# Patient Record
Sex: MALE | Race: WHITE | NOT HISPANIC OR LATINO | Employment: FULL TIME | URBAN - METROPOLITAN AREA
[De-identification: names, ages, dates, MRNs, and addresses within clinical notes are randomized per-mention and may not be internally consistent; named-entity substitution may affect disease eponyms.]

---

## 2017-10-29 ENCOUNTER — APPOINTMENT (EMERGENCY)
Dept: RADIOLOGY | Facility: HOSPITAL | Age: 52
End: 2017-10-29
Payer: COMMERCIAL

## 2017-10-29 ENCOUNTER — HOSPITAL ENCOUNTER (EMERGENCY)
Facility: HOSPITAL | Age: 52
Discharge: HOME | End: 2017-10-29
Attending: EMERGENCY MEDICINE | Admitting: EMERGENCY MEDICINE
Payer: COMMERCIAL

## 2017-10-29 VITALS
RESPIRATION RATE: 20 BRPM | SYSTOLIC BLOOD PRESSURE: 177 MMHG | OXYGEN SATURATION: 97 % | BODY MASS INDEX: 27.94 KG/M2 | WEIGHT: 178 LBS | DIASTOLIC BLOOD PRESSURE: 100 MMHG | HEIGHT: 67 IN | HEART RATE: 85 BPM | TEMPERATURE: 98.1 F
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
## 2017-10-29 DIAGNOSIS — S89.92XA LEFT LEG INJURY, INITIAL ENCOUNTER: Primary | ICD-10-CM

## 2017-10-29 PROCEDURE — 73564 X-RAY EXAM KNEE 4 OR MORE: CPT

## 2017-10-29 PROCEDURE — 73502 X-RAY EXAM HIP UNI 2-3 VIEWS: CPT

## 2017-10-29 PROCEDURE — 99283 EMERGENCY DEPT VISIT LOW MDM: CPT

## 2017-10-29 PROCEDURE — 73552 X-RAY EXAM OF FEMUR 2/>: CPT

## 2017-10-29 RX ORDER — TRAMADOL HYDROCHLORIDE 50 MG/1
50 TABLET ORAL EVERY 6 HOURS PRN
Qty: 10 TABLET | Refills: 0 | Status: SHIPPED | OUTPATIENT
Start: 2017-10-29 | End: 2019-05-06 | Stop reason: ALTCHOICE

## 2017-10-29 RX ORDER — AMLODIPINE BESYLATE AND ATORVASTATIN CALCIUM 5; 20 MG/1; MG/1
1 TABLET, FILM COATED ORAL DAILY
COMMUNITY
End: 2018-11-05 | Stop reason: SDUPTHER

## 2017-10-29 NOTE — ED PROVIDER NOTES
History  Chief Complaint   Patient presents with    Leg Injury     Patient states"he fell down hsi basement stairs and injured his left thigh"  Patient is a 75-year-old male with past medical history significant for CLL, currently receiving daily chemotherapy, hypertension and newly diagnosed COPD, presents to the emergency department complaining of left thigh pain after he fell down several steps last night  Patient states he went to check on the basement to ensure that the lights were turned off before bed last night and when he took a step with his right leg on the staircase, he tripped and his left leg got stuck, causing him to fall down 3-4 steps  He states he heard and felt a crack in his left thigh and has been having pain in the left thigh since  He denies hitting his head or loss of consciousness  He denies any presyncopal symptoms prior to the fall  He denies being on any blood thinners  He took ibuprofen earlier today with mild relief  He states the pain is worse with weight-bearing as well as with hip and knee flexion  He states he has no difficulty walking down steps however he cannot lift his leg to walk up the steps  He denies any prior left lower extremity injury or fracture  Denies any associated left lower extremity weakness or paresthesia  Denies any significant swelling, bruising or erythema  Denies any other associated symptoms and review of systems otherwise negative  No fever, chills, headache, dizziness, productive cough, chest pain, dyspnea, abdominal pain, nausea, vomiting, diarrhea, constipation, urinary symptoms, focal neurologic deficits  History provided by:  Patient and spouse   used: No        Prior to Admission Medications   Prescriptions Last Dose Informant Patient Reported? Taking?    Glycopyrrolate-Formoterol (BEVESPI AEROSPHERE) 9-4 8 MCG/ACT AERO   Yes Yes   Sig: Inhale   Ibrutinib (IMBRUVICA) 140 MG CAPS   Yes Yes   Sig: Take by mouth Mometasone Furo-Formoterol Fum (DULERA) 100-5 MCG/ACT AERO   Yes Yes   Sig: Inhale   Mometasone Furo-Formoterol Fum (DULERA) 200-5 MCG/ACT AERO   Yes Yes   Sig: Inhale   amLODIPine-atorvastatin (CADUET) 5-20 MG per tablet   Yes Yes   Sig: Take 1 tablet by mouth daily      Facility-Administered Medications: None       Past Medical History:   Diagnosis Date    CLL (chronic lymphocytic leukemia) (HCC)     COPD (chronic obstructive pulmonary disease) (HCC)     Hypertension        Past Surgical History:   Procedure Laterality Date    LYMPH NODE BIOPSY         History reviewed  No pertinent family history  I have reviewed and agree with the history as documented  Social History   Substance Use Topics    Smoking status: Never Smoker    Smokeless tobacco: Never Used    Alcohol use No        Review of Systems   Constitutional: Negative for chills and fever  HENT: Negative for congestion, ear pain, rhinorrhea and sore throat  Eyes: Negative for photophobia, pain and visual disturbance  Respiratory: Negative for cough, chest tightness, shortness of breath and wheezing  Cardiovascular: Negative for chest pain, palpitations and leg swelling  Gastrointestinal: Negative for abdominal pain, constipation, diarrhea, nausea and vomiting  Genitourinary: Negative for dysuria, flank pain, frequency and hematuria  Musculoskeletal: Negative for back pain, joint swelling, neck pain and neck stiffness  + Left thigh and knee pain  Skin: Negative for color change, rash and wound  Allergic/Immunologic: Negative for immunocompromised state  Neurological: Negative for dizziness, weakness, light-headedness, numbness and headaches  Psychiatric/Behavioral: Negative for confusion and decreased concentration  All other systems reviewed and are negative        Physical Exam  ED Triage Vitals [10/29/17 1744]   Temperature Pulse Respirations Blood Pressure SpO2   98 1 °F (36 7 °C) 85 20 (!) 177/100 97 % Temp Source Heart Rate Source Patient Position - Orthostatic VS BP Location FiO2 (%)   Oral Monitor Sitting Left arm --      Pain Score       --           Physical Exam   Constitutional: He is oriented to person, place, and time  He appears well-developed and well-nourished  No distress  Patient ambulatory to examination room however is limping on left leg and favoring right lower extremity  HENT:   Head: Normocephalic and atraumatic  Mouth/Throat: Oropharynx is clear and moist    Eyes: Conjunctivae and EOM are normal  Pupils are equal, round, and reactive to light  Neck: Normal range of motion  Neck supple  No JVD present  Cardiovascular: Normal rate, regular rhythm, normal heart sounds and intact distal pulses  Exam reveals no gallop and no friction rub  No murmur heard  Pulmonary/Chest: Effort normal and breath sounds normal  No respiratory distress  He has no wheezes  He has no rales  Abdominal: Soft  He exhibits no distension  There is no tenderness  There is no rebound and no guarding  Musculoskeletal: He exhibits tenderness  He exhibits no edema or deformity  LEFT LOWER EXTREMITY:  No obvious left lower extremity deformity  No significant soft tissue swelling or joint effusion  There is tenderness over the left greater trochanter, lateral and anterior thigh  No tenderness distal to the knee joint  Full passive range of motion of left hip and left knee  Limited active range of motion with hip and knee flexion secondary to pain  2+ DP and PT pulse  No overlying skin color or temperature abnormalities  Neurological: He is alert and oriented to person, place, and time  No gross motor or sensory deficits  Skin: Skin is warm and dry  He is not diaphoretic  No erythema  No pallor  Psychiatric: He has a normal mood and affect  His behavior is normal    Nursing note and vitals reviewed        ED Medications  Medications - No data to display    Diagnostic Studies  Results Reviewed None                 XR femur 2 views LEFT   ED Interpretation by Sahara Isabel DO (10/29 1822)   No acute osseous abnormality  XR knee 4+ views left injury   ED Interpretation by Sahara Isabel DO (10/29 1822)   No acute osseous abnormality  XR hip/pelv 2-3 vws left   ED Interpretation by Sahara Isabel DO (10/29 1822)   No acute osseous abnormality  Procedures  Procedures       Phone Contacts  ED Phone Contact    ED Course  ED Course                                MDM  Number of Diagnoses or Management Options  Diagnosis management comments: Left lower extremity injury status post mechanical fall down several steps  Low clinical suspicion for acute fracture given that this injury occurred last night and patient has been walking on the leg since  Will obtain x-rays of the left hip/pelvis, left femur and left knee  Offered patient pain medication however he declined at this time and states he just recently took ibuprofen  If x-rays, unremarkable, will discharge home with instructions to take NSAIDs as needed, ice on and off, rest and elevation  Will have him follow up with his family doctor  Amount and/or Complexity of Data Reviewed  Tests in the radiology section of CPT®: ordered and reviewed  Independent visualization of images, tracings, or specimens: yes      CritCare Time    Disposition  Final diagnoses:   Left leg injury, initial encounter   Fall down steps, initial encounter     Time reflects when diagnosis was documented in both MDM as applicable and the Disposition within this note     Time User Action Codes Description Comment    10/29/2017  6:23 PM Travis León Add [C67 [id number]] Left leg injury, initial encounter     10/29/2017  6:23 PM Reyna, [address] Fall down steps, initial encounter       ED Disposition     ED Disposition Condition Comment    Discharge  Tyra Calzada discharge to home/self care      Condition at discharge: Stable Follow-up Information     Follow up With Specialties Details Why Contact Info Additional Information    Michael Strong MD Internal Medicine Schedule an appointment as soon as possible for a visit  [address] [facility], [address] [facility], Orthopedic Surgery Schedule an appointment as soon as possible for a visit  [address] [place] [address]       [facility] Department Emergency Medicine Go to If symptoms worsen [address] [facility] [address] [place] ED, [address] [place], [place], [facility]        Patient's Medications   Discharge Prescriptions    TRAMADOL (ULTRAM) 50 MG TABLET    Take 1 tablet by mouth every 6 (six) hours as needed for severe pain       Start Date: 10/29/2017End Date: --       Order Dose: 50 mg       Quantity: 10 tablet    Refills: 0     No discharge procedures on file      ED Provider  Electronically Signed by           Rick Goodman DO  10/29/17 7227

## 2017-10-29 NOTE — DISCHARGE INSTRUCTIONS
Leg Sprain, Ambulatory Care   GENERAL INFORMATION:   A leg sprain  is an injury that occurs when your ligaments are forced to stretch beyond their normal range  Ligaments are tough tissues that support joints, and connect and keep bones in place  Sprains mainly occur with twisting, falling, or blunt force injuries  Mild sprains may take up to 6 weeks to heal  Severe sprains can take up to 12 months to heal   Common symptoms include the following:   · Inability to put weight on your leg    · Pain, tenderness, and swelling    · Muscle spasms  Seek immediate care for the following symptoms:   · Cold or numbness below the injury, such as in your toes    · Decreased or loss of movement in your injured leg    · Increased pain, even after taking pain medicine    · Red skin streaks near your injury  Treatment for a leg sprain  may include pain medicine and physical therapy  Treatment may also include a support device, such as a brace, cast, or splint  These devices limit movement and protect further injury  Care for a leg sprain:   · Rest  your leg for up to 2 days to help it heal  Use crutches as directed to take weight off your leg while it heals  · Apply ice  on your leg for 15 to 20 minutes every hour or as directed  Use an ice pack, or put crushed ice in a plastic bag  Cover it with a towel  Ice helps prevent tissue damage and decreases swelling and pain  · Compress  your injured leg as directed with an elastic bandage for support  You may need a splint if your sprain is severe  Wear your splint for as many days as directed  · Elevate  your injured leg by lying down and resting it on pillows that are higher than your heart  This should be done as often as you can for at least 2 days to reduce swelling  · Exercise  your leg as directed to improve your strength and help decrease stiffness  The exercises and physical therapy can help restore strength and increase the range of motion in your leg   Ask your healthcare provider when you can return to your normal activities or play sports  Prevent another leg sprain:   · Warm up, cool down, and stretch before and after you exercise  This may help ease your body into activity, and prevent another injury  · Wear protective equipment for activities  This will prevent another injury  · Wear shoes that fit well  Replace your shoes when the tread or heels are worn down  · Do not exercise when you are tired or in pain  You are more likely to become injured if your body is not rested  · Make the places you walk safer  Keep your pathways clear of objects so you do not trip over them  Pour salt on driveways and walkways in the winter to help prevent you from slipping on ice  · Run and walk on flat surfaces  Bumpy or curvy paths put you at risk for another injury  Follow up with your healthcare provider as directed:  Write down your questions so you remember to ask them during your visits  CARE AGREEMENT:   You have the right to help plan your care  Learn about your health condition and how it may be treated  Discuss treatment options with your caregivers to decide what care you want to receive  You always have the right to refuse treatment  The above information is an  only  It is not intended as medical advice for individual conditions or treatments  Talk to your doctor, nurse or pharmacist before following any medical regimen to see if it is safe and effective for you  © 2014 [address] is for End User's use only and may not be sold, redistributed or otherwise used for commercial purposes  All illustrations and images included in CareNotes® are the copyrighted property of [employer]  or Mina Sánchez

## 2018-09-19 ENCOUNTER — OFFICE VISIT (OUTPATIENT)
Dept: CARDIOLOGY CLINIC | Facility: CLINIC | Age: 53
End: 2018-09-19
Payer: COMMERCIAL

## 2018-09-19 VITALS
HEART RATE: 96 BPM | BODY MASS INDEX: 28.72 KG/M2 | WEIGHT: 183 LBS | DIASTOLIC BLOOD PRESSURE: 82 MMHG | SYSTOLIC BLOOD PRESSURE: 144 MMHG | HEIGHT: 67 IN | OXYGEN SATURATION: 96 %

## 2018-09-19 DIAGNOSIS — R06.00 DYSPNEA ON EXERTION: Primary | ICD-10-CM

## 2018-09-19 DIAGNOSIS — I10 ESSENTIAL HYPERTENSION: ICD-10-CM

## 2018-09-19 PROCEDURE — 99214 OFFICE O/P EST MOD 30 MIN: CPT | Performed by: INTERNAL MEDICINE

## 2018-09-19 NOTE — PATIENT INSTRUCTIONS
Check echo to assess for lvef and rwm for abnormalities  Check stress echo to assess for ishcemia and exercise ability  Return in 6-8 weeks  Call if symptoms worsen

## 2018-09-19 NOTE — PROGRESS NOTES
[facility] [place]  [address]  [address]  Cardiology Follow Up    Juan Grewal  1965  [id number]    1  Dyspnea on exertion  Echo complete with contrast if indicated    Echo stress test w contrast if indicated   2  Essential hypertension         Chief Complaint   Patient presents with    Follow-up    Shortness of Breath       Interval History:  Patient presents to the office for follow-up visit with complaints of shortness of breath  Patient states he has been having shortness of breath for the last couple of months  He was seen by allergist and was worked up with no real diagnosis  He has also seen by pulmonologist had multiple tests done with no real diagnosis to explain his shortness of breath  Patient states he gets short of breath with minimal exertion  Patient denies any chest pain, chest pressure, chest heaviness  Patient is a former smoker quitting more than 20 years ago  Past Medical History:   Diagnosis Date    [facility] )     [facility] disease) ([facility])     Hypertension      Social History     Social History    Marital status: /Civil Union     Spouse name: N/A    Number of children: N/A    Years of education: N/A     Occupational History    Not on file  Social History Main Topics    Smoking status: Former Smoker    Smokeless tobacco: Never Used    Alcohol use No    Drug use: No    Sexual activity: Not on file     Other Topics Concern    Not on file     Social History Narrative    No narrative on file      History reviewed  No pertinent family history    Past Surgical History:   Procedure Laterality Date    LYMPH NODE BIOPSY         Current Outpatient Prescriptions:     amLODIPine-atorvastatin (CADUET) 5-20 MG per tablet, Take 1 tablet by mouth daily, Disp: , Rfl:     Ibrutinib (IMBRUVICA) 140 MG CAPS, Take by mouth 2 (two) times a day  , Disp: , Rfl:     Mometasone Furo-Formoterol Fum (DULERA) 100-5 MCG/ACT AERO, Inhale, Disp: , Rfl:     Mometasone Furo-Formoterol Fum (DULERA) 200-5 MCG/ACT AERO, Inhale, Disp: , Rfl:     Glycopyrrolate-Formoterol (BEVESPI AEROSPHERE) 9-4 8 MCG/ACT AERO, Inhale, Disp: , Rfl:     traMADol (ULTRAM) 50 mg tablet, Take 1 tablet by mouth every 6 (six) hours as needed for severe pain (Patient not taking: Reported on 9/19/2018 ), Disp: 10 tablet, Rfl: 0  No Known Allergies      Imaging: No results found  Review of Systems:  Review of Systems   Constitutional: Negative  HENT: Negative  Respiratory: Positive for shortness of breath  Cardiovascular: Negative  Genitourinary: Negative  Neurological: Negative  Hematological: Negative  All other systems reviewed and are negative  /82   Pulse 96   Ht 5' 7" (1 702 m)   Wt 83 kg (183 lb)   SpO2 96%   BMI 28 66 kg/m²     Physical Exam:  Physical Exam   Constitutional: He is oriented to person, place, and time  He appears well-developed and well-nourished  HENT:   Head: Normocephalic and atraumatic  Cardiovascular: Normal rate, regular rhythm and normal heart sounds  Pulmonary/Chest: Effort normal and breath sounds normal    Musculoskeletal: Normal range of motion  He exhibits no edema  Neurological: He is alert and oriented to person, place, and time  Skin: Skin is warm and dry  Psychiatric: He has a normal mood and affect  His behavior is normal  Judgment and thought content normal    Nursing note and vitals reviewed  Discussion/Summary:  1-dyspnea on exertion, new for the patient  Will check echocardiogram to assess LV function and regional wall motion for abnormalities  Will check stress echocardiogram to assess for ischemia and exercise ability  Briefly discussed the any of these tests are abnormal will need further testing including cardiac catheterization  2-hypertension, stable    Continue all medications    Return to the office in 6-8 weeks  Continue all medications  Previous studies reviewed with patient, medications reviewed and possible side effects discussed  Continue risk factor modification  Optimize weight, regular exercise and follow up with appropriate specialists and primary care physician as discussed  All questions answered  Patient advised to report any problems prompting to medical attention   Return for follow up visit in 6 weeks or earlier if needed

## 2018-09-21 ENCOUNTER — TELEPHONE (OUTPATIENT)
Dept: CARDIOLOGY CLINIC | Facility: CLINIC | Age: 53
End: 2018-09-21

## 2018-09-21 NOTE — TELEPHONE ENCOUNTER
Patients echo and stress echo both require peer to peer review  Must be done before 9/25 or case will close out  Phone number [phone]  Use patients ID to access- [id number]  Thank you

## 2018-10-17 ENCOUNTER — HOSPITAL ENCOUNTER (OUTPATIENT)
Dept: NON INVASIVE DIAGNOSTICS | Facility: CLINIC | Age: 53
Discharge: HOME | End: 2018-10-17
Payer: COMMERCIAL

## 2018-10-17 DIAGNOSIS — R06.00 DYSPNEA ON EXERTION: ICD-10-CM

## 2018-10-17 LAB
MAX DIASTOLIC BP: 106 MMHG
MAX HEART RATE: 148 BPM
MAX PREDICTED HEART RATE: 166 BPM
MAX. SYSTOLIC BP: 248 MMHG
PROTOCOL NAME: NORMAL
TARGET HR FORMULA: NORMAL
TEST INDICATION: NORMAL
TIME IN EXERCISE PHASE: NORMAL

## 2018-10-17 PROCEDURE — 93306 TTE W/DOPPLER COMPLETE: CPT

## 2018-10-17 PROCEDURE — 93350 STRESS TTE ONLY: CPT

## 2018-10-18 PROCEDURE — 93351 STRESS TTE COMPLETE: CPT | Performed by: INTERNAL MEDICINE

## 2018-10-18 PROCEDURE — 93306 TTE W/DOPPLER COMPLETE: CPT | Performed by: INTERNAL MEDICINE

## 2018-10-30 ENCOUNTER — OFFICE VISIT (OUTPATIENT)
Dept: CARDIOLOGY CLINIC | Facility: CLINIC | Age: 53
End: 2018-10-30
Payer: COMMERCIAL

## 2018-10-30 VITALS
DIASTOLIC BLOOD PRESSURE: 82 MMHG | SYSTOLIC BLOOD PRESSURE: 138 MMHG | HEIGHT: 67 IN | WEIGHT: 179 LBS | BODY MASS INDEX: 28.09 KG/M2 | OXYGEN SATURATION: 97 % | HEART RATE: 97 BPM

## 2018-10-30 DIAGNOSIS — R06.00 DYSPNEA ON EXERTION: ICD-10-CM

## 2018-10-30 DIAGNOSIS — I10 HYPERTENSION, ESSENTIAL: Primary | ICD-10-CM

## 2018-10-30 PROCEDURE — 99213 OFFICE O/P EST LOW 20 MIN: CPT | Performed by: INTERNAL MEDICINE

## 2018-10-30 NOTE — PROGRESS NOTES
[facility]  [address] [facility]  [address]  Cardiology Follow Up    Ashish Miller  1965  [id number]      1  Hypertension, essential     2  Dyspnea on exertion         Chief Complaint   Patient presents with    Follow-up    Results       Interval History:   Patient presents for follow-up visit  Patient states that his shortness of breath is improved  Patient's chemotherapy agents were decreased  His shortness of breath has gotten better after that  Patient denies any chest pain  No history of leg edema orthopnea PN D  No history of presyncope syncope  He states that he has been compliant with all his present medications  Patient Active Problem List   Diagnosis    Hypertension, essential     Past Medical History:   Diagnosis Date    [facility] ([facility] )     COPD [facility]chronic obstructive pulmonary disease) ([facility] )     Hypertension      Social History     Social History    Marital status: /Civil Union     Spouse name: N/A    Number of children: N/A    Years of education: N/A     Occupational History    Not on file  Social History Main Topics    Smoking status: Former Smoker    Smokeless tobacco: Never Used    Alcohol use No    Drug use: No    Sexual activity: Not on file     Other Topics Concern    Not on file     Social History Narrative    No narrative on file      History reviewed  No pertinent family history    Past Surgical History:   Procedure Laterality Date    LYMPH NODE BIOPSY         Current Outpatient Prescriptions:     amLODIPine-atorvastatin (CADUET) 5-20 MG per tablet, Take 1 tablet by mouth daily, Disp: , Rfl:     Glycopyrrolate-Formoterol (BEVESPI AEROSPHERE) 9-4 8 MCG/ACT AERO, Inhale, Disp: , Rfl:     Ibrutinib (IMBRUVICA) 140 MG CAPS, Take by mouth daily  , Disp: , Rfl:     Mometasone Furo-Formoterol Fum (DULERA) 100-5 MCG/ACT AERO, Inhale, Disp: , Rfl:     Mometasone Furo-Formoterol Fum (Arevalo Mering) 200-5 MCG/ACT AERO, Inhale, Disp: , Rfl:     traMADol (ULTRAM) 50 mg tablet, Take 1 tablet by mouth every 6 (six) hours as needed for severe pain (Patient not taking: Reported on 9/19/2018 ), Disp: 10 tablet, Rfl: 0  No Known Allergies    Labs:  Hospital Outpatient Visit on 10/17/2018   Component Date Value    Protocol Name 10/17/2018 BRENT     Time In Exercise Phase 10/17/2018 00:06:00     MAX  SYSTOLIC BP 88/50/0403 424     Max Diastolic Bp 88/75/6199 266     Max Heart Rate 10/17/2018 148     Max Predicted Heart Rate 10/17/2018 166     Reason for Termination 10/17/2018                      Value:Target Heart Rate Achieved  Exaggerated BP increase      Test Indication 10/17/2018 Dyspnea on effort     Target Hr Formular 10/17/2018 (220 - Age)*85%      Imaging: No results found  Review of Systems:  Review of Systems   REVIEW OF SYSTEMS:  Constitutional:  Denies fever or chills   Eyes:  Denies change in visual acuity   HENT:  Denies nasal congestion or sore throat   Respiratory:  shortness of breath   Cardiovascular:  Denies chest pain or edema   GI:  Denies abdominal pain, nausea, vomiting, bloody stools or diarrhea   :  Denies dysuria, frequency, difficulty in micturition and nocturia  Musculoskeletal:  Denies back pain or joint pain   Neurologic:  Denies headache, focal weakness or sensory changes   Endocrine:  Denies polyuria or polydipsia   Lymphatic:  Denies swollen glands   Psychiatric:  Denies depression or anxiety     Physical Exam:    /82   Pulse 97   Ht 5' 7" (1 702 m)   Wt 81 2 kg (179 lb)   SpO2 97%   BMI 28 04 kg/m²     Physical Exam   PHYSICAL EXAM:  General:  Patient is not in acute distress   Head: Normocephalic, Atraumatic  HEENT:  Both pupils normal-size atraumatic, normocephalic, nonicteric  Neck:  JVP not raised  Trachea central  No carotid bruit  Respiratory:  normal breath sounds no crackles   no rhonchi  Cardiovascular:  Regular rate and rhythm no S3 no murmurs  GI: Abdomen soft nontender  No organomegaly  Lymphatic:  No cervical or inguinal lymphadenopathy  Neurologic:  Patient is awake alert, oriented   Grossly nonfocal    Discussion/Summary:   Patient with known history of hypertension with symptoms of shortness of breath which has in fact improved according to the patient  Patient did have a stress echocardiogram which was negative for ischemia  Sensitivity and specificity of stress test discussed at length with patient and family  Symptoms to watch out from cardiac standpoint which would indicate the need for further cardiac evaluation including cardiac catheterization discussed  Patient will keep a close watch on his symptoms and report any symptoms out of the ordinary  Dietary and risk factor modification to continue  Follow-up with primary care physician as well as Hematology Oncology  Follow-up in 6 months or earlier as needed

## 2018-11-05 DIAGNOSIS — I10 HYPERTENSION, UNSPECIFIED TYPE: Primary | ICD-10-CM

## 2018-11-05 RX ORDER — AMLODIPINE BESYLATE AND ATORVASTATIN CALCIUM 5; 20 MG/1; MG/1
1 TABLET, FILM COATED ORAL DAILY
Qty: 90 TABLET | Refills: 3 | Status: SHIPPED | OUTPATIENT
Start: 2018-11-05 | End: 2019-07-17 | Stop reason: SDUPTHER

## 2019-04-24 ENCOUNTER — OFFICE VISIT (OUTPATIENT)
Dept: CARDIOLOGY CLINIC | Facility: CLINIC | Age: 54
End: 2019-04-24
Payer: COMMERCIAL

## 2019-04-24 VITALS
SYSTOLIC BLOOD PRESSURE: 130 MMHG | HEIGHT: 67 IN | WEIGHT: 183.8 LBS | DIASTOLIC BLOOD PRESSURE: 82 MMHG | BODY MASS INDEX: 28.85 KG/M2 | OXYGEN SATURATION: 96 % | HEART RATE: 91 BPM

## 2019-04-24 DIAGNOSIS — R06.02 SHORTNESS OF BREATH: ICD-10-CM

## 2019-04-24 DIAGNOSIS — I10 HYPERTENSION, ESSENTIAL: Primary | ICD-10-CM

## 2019-04-24 PROCEDURE — 99214 OFFICE O/P EST MOD 30 MIN: CPT | Performed by: INTERNAL MEDICINE

## 2019-04-24 RX ORDER — FLUTICASONE FUROATE AND VILANTEROL 100; 25 UG/1; UG/1
1 POWDER RESPIRATORY (INHALATION) DAILY
COMMUNITY

## 2019-04-24 RX ORDER — BENAZEPRIL HYDROCHLORIDE 20 MG/1
20 TABLET ORAL DAILY
COMMUNITY

## 2019-04-25 ENCOUNTER — TELEPHONE (OUTPATIENT)
Dept: CARDIOLOGY CLINIC | Facility: CLINIC | Age: 54
End: 2019-04-25

## 2019-04-27 ENCOUNTER — APPOINTMENT (OUTPATIENT)
Dept: LAB | Facility: CLINIC | Age: 54
End: 2019-04-27
Payer: COMMERCIAL

## 2019-04-27 LAB
ALBUMIN SERPL BCP-MCNC: 4 G/DL (ref 3.5–5)
ALP SERPL-CCNC: 83 U/L (ref 46–116)
ALT SERPL W P-5'-P-CCNC: 42 U/L (ref 12–78)
ANION GAP SERPL CALCULATED.3IONS-SCNC: 7 MMOL/L (ref 4–13)
AST SERPL W P-5'-P-CCNC: 20 U/L (ref 5–45)
BASOPHILS # BLD AUTO: 0.09 THOUSANDS/ΜL (ref 0–0.1)
BASOPHILS NFR BLD AUTO: 1 % (ref 0–1)
BILIRUB SERPL-MCNC: 0.48 MG/DL (ref 0.2–1)
BUN SERPL-MCNC: 19 MG/DL (ref 5–25)
CALCIUM SERPL-MCNC: 8.6 MG/DL (ref 8.3–10.1)
CHLORIDE SERPL-SCNC: 109 MMOL/L (ref 100–108)
CO2 SERPL-SCNC: 25 MMOL/L (ref 21–32)
CREAT SERPL-MCNC: 1.17 MG/DL (ref 0.6–1.3)
EOSINOPHIL # BLD AUTO: 0.19 THOUSAND/ΜL (ref 0–0.61)
EOSINOPHIL NFR BLD AUTO: 3 % (ref 0–6)
ERYTHROCYTE [DISTWIDTH] IN BLOOD BY AUTOMATED COUNT: 13.9 % (ref 11.6–15.1)
GFR SERPL CREATININE-BSD FRML MDRD: 70 ML/MIN/1.73SQ M
GLUCOSE P FAST SERPL-MCNC: 96 MG/DL (ref 65–99)
HCT VFR BLD AUTO: 44.3 % (ref 36.5–49.3)
HGB BLD-MCNC: 14.2 G/DL (ref 12–17)
IMM GRANULOCYTES # BLD AUTO: 0.11 THOUSAND/UL (ref 0–0.2)
IMM GRANULOCYTES NFR BLD AUTO: 2 % (ref 0–2)
INR PPP: 0.95 (ref 0.86–1.17)
LYMPHOCYTES # BLD AUTO: 1.32 THOUSANDS/ΜL (ref 0.6–4.47)
LYMPHOCYTES NFR BLD AUTO: 18 % (ref 14–44)
MCH RBC QN AUTO: 25.9 PG (ref 26.8–34.3)
MCHC RBC AUTO-ENTMCNC: 32.1 G/DL (ref 31.4–37.4)
MCV RBC AUTO: 81 FL (ref 82–98)
MONOCYTES # BLD AUTO: 0.68 THOUSAND/ΜL (ref 0.17–1.22)
MONOCYTES NFR BLD AUTO: 9 % (ref 4–12)
NEUTROPHILS # BLD AUTO: 5.02 THOUSANDS/ΜL (ref 1.85–7.62)
NEUTS SEG NFR BLD AUTO: 67 % (ref 43–75)
NRBC BLD AUTO-RTO: 0 /100 WBCS
PLATELET # BLD AUTO: 186 THOUSANDS/UL (ref 149–390)
PMV BLD AUTO: 12.7 FL (ref 8.9–12.7)
POTASSIUM SERPL-SCNC: 4 MMOL/L (ref 3.5–5.3)
PROT SERPL-MCNC: 6.8 G/DL (ref 6.4–8.2)
PROTHROMBIN TIME: 12.8 SECONDS (ref 11.8–14.2)
RBC # BLD AUTO: 5.48 MILLION/UL (ref 3.88–5.62)
SODIUM SERPL-SCNC: 141 MMOL/L (ref 136–145)
WBC # BLD AUTO: 7.41 THOUSAND/UL (ref 4.31–10.16)

## 2019-04-27 PROCEDURE — 85025 COMPLETE CBC W/AUTO DIFF WBC: CPT | Performed by: INTERNAL MEDICINE

## 2019-04-27 PROCEDURE — 85610 PROTHROMBIN TIME: CPT | Performed by: INTERNAL MEDICINE

## 2019-04-27 PROCEDURE — 80053 COMPREHEN METABOLIC PANEL: CPT | Performed by: INTERNAL MEDICINE

## 2019-04-27 PROCEDURE — 36415 COLL VENOUS BLD VENIPUNCTURE: CPT | Performed by: INTERNAL MEDICINE

## 2019-04-30 ENCOUNTER — HOSPITAL ENCOUNTER (OUTPATIENT)
Dept: NON INVASIVE DIAGNOSTICS | Facility: CLINIC | Age: 54
Discharge: HOME | End: 2019-04-30
Payer: COMMERCIAL

## 2019-04-30 DIAGNOSIS — R06.02 SHORTNESS OF BREATH: ICD-10-CM

## 2019-04-30 PROCEDURE — 93017 CV STRESS TEST TRACING ONLY: CPT

## 2019-04-30 PROCEDURE — 78452 HT MUSCLE IMAGE SPECT MULT: CPT

## 2019-04-30 PROCEDURE — 78452 HT MUSCLE IMAGE SPECT MULT: CPT | Performed by: INTERNAL MEDICINE

## 2019-04-30 PROCEDURE — 93016 CV STRESS TEST SUPVJ ONLY: CPT | Performed by: INTERNAL MEDICINE

## 2019-04-30 PROCEDURE — A9502 TC99M TETROFOSMIN: HCPCS

## 2019-04-30 PROCEDURE — 93018 CV STRESS TEST I&R ONLY: CPT | Performed by: INTERNAL MEDICINE

## 2019-05-01 ENCOUNTER — TELEPHONE (OUTPATIENT)
Dept: CARDIOLOGY CLINIC | Facility: CLINIC | Age: 54
End: 2019-05-01

## 2019-05-03 ENCOUNTER — TELEPHONE (OUTPATIENT)
Dept: NON INVASIVE DIAGNOSTICS | Facility: HOSPITAL | Age: 54
End: 2019-05-03

## 2019-05-03 ENCOUNTER — TELEPHONE (OUTPATIENT)
Dept: SURGERY | Facility: HOSPITAL | Age: 54
End: 2019-05-03

## 2019-05-03 RX ORDER — SODIUM CHLORIDE 9 MG/ML
75 INJECTION, SOLUTION INTRAVENOUS CONTINUOUS
Status: CANCELLED | OUTPATIENT
Start: 2019-05-03

## 2019-05-06 ENCOUNTER — HOSPITAL ENCOUNTER (OUTPATIENT)
Dept: INTERVENTIONAL RADIOLOGY/VASCULAR | Facility: HOSPITAL | Age: 54
Discharge: HOME | End: 2019-05-06
Attending: INTERNAL MEDICINE
Payer: COMMERCIAL

## 2019-05-06 VITALS
OXYGEN SATURATION: 96 % | WEIGHT: 183.2 LBS | RESPIRATION RATE: 18 BRPM | HEART RATE: 79 BPM | HEIGHT: 67 IN | DIASTOLIC BLOOD PRESSURE: 84 MMHG | BODY MASS INDEX: 28.75 KG/M2 | TEMPERATURE: 98.4 F | SYSTOLIC BLOOD PRESSURE: 153 MMHG

## 2019-05-06 DIAGNOSIS — R06.02 SHORTNESS OF BREATH: ICD-10-CM

## 2019-05-06 PROCEDURE — C1769 GUIDE WIRE: HCPCS | Performed by: INTERNAL MEDICINE

## 2019-05-06 PROCEDURE — 99152 MOD SED SAME PHYS/QHP 5/>YRS: CPT | Performed by: INTERNAL MEDICINE

## 2019-05-06 PROCEDURE — C1894 INTRO/SHEATH, NON-LASER: HCPCS | Performed by: INTERNAL MEDICINE

## 2019-05-06 PROCEDURE — 93451 RIGHT HEART CATH: CPT | Performed by: INTERNAL MEDICINE

## 2019-05-06 PROCEDURE — 82810 BLOOD GASES O2 SAT ONLY: CPT | Performed by: INTERNAL MEDICINE

## 2019-05-06 PROCEDURE — 99153 MOD SED SAME PHYS/QHP EA: CPT | Performed by: INTERNAL MEDICINE

## 2019-05-06 RX ORDER — FENTANYL CITRATE 50 UG/ML
INJECTION, SOLUTION INTRAMUSCULAR; INTRAVENOUS CODE/TRAUMA/SEDATION MEDICATION
Status: COMPLETED | OUTPATIENT
Start: 2019-05-06 | End: 2019-05-06

## 2019-05-06 RX ORDER — SODIUM CHLORIDE 9 MG/ML
75 INJECTION, SOLUTION INTRAVENOUS CONTINUOUS
Status: DISCONTINUED | OUTPATIENT
Start: 2019-05-06 | End: 2019-05-10 | Stop reason: HOSPADM

## 2019-05-06 RX ORDER — MIDAZOLAM HYDROCHLORIDE 1 MG/ML
INJECTION INTRAMUSCULAR; INTRAVENOUS CODE/TRAUMA/SEDATION MEDICATION
Status: COMPLETED | OUTPATIENT
Start: 2019-05-06 | End: 2019-05-06

## 2019-05-06 RX ORDER — LIDOCAINE HYDROCHLORIDE 10 MG/ML
INJECTION, SOLUTION INFILTRATION; PERINEURAL CODE/TRAUMA/SEDATION MEDICATION
Status: COMPLETED | OUTPATIENT
Start: 2019-05-06 | End: 2019-05-06

## 2019-05-06 RX ADMIN — FENTANYL CITRATE 50 MCG: 50 INJECTION, SOLUTION INTRAMUSCULAR; INTRAVENOUS at 10:35

## 2019-05-06 RX ADMIN — LIDOCAINE HYDROCHLORIDE 8 ML: 10 INJECTION, SOLUTION INFILTRATION; PERINEURAL at 10:50

## 2019-05-06 RX ADMIN — MIDAZOLAM HYDROCHLORIDE 1 MG: 1 INJECTION, SOLUTION INTRAMUSCULAR; INTRAVENOUS at 10:35

## 2019-07-17 ENCOUNTER — OFFICE VISIT (OUTPATIENT)
Dept: CARDIOLOGY CLINIC | Facility: CLINIC | Age: 54
End: 2019-07-17
Payer: COMMERCIAL

## 2019-07-17 VITALS
SYSTOLIC BLOOD PRESSURE: 158 MMHG | HEART RATE: 82 BPM | WEIGHT: 184.6 LBS | BODY MASS INDEX: 28.97 KG/M2 | DIASTOLIC BLOOD PRESSURE: 84 MMHG | OXYGEN SATURATION: 96 % | HEIGHT: 67 IN

## 2019-07-17 DIAGNOSIS — I10 HYPERTENSION, ESSENTIAL: Primary | ICD-10-CM

## 2019-07-17 DIAGNOSIS — R06.02 SHORTNESS OF BREATH: ICD-10-CM

## 2019-07-17 DIAGNOSIS — I10 HYPERTENSION, UNSPECIFIED TYPE: ICD-10-CM

## 2019-07-17 PROCEDURE — 99213 OFFICE O/P EST LOW 20 MIN: CPT | Performed by: INTERNAL MEDICINE

## 2019-07-17 RX ORDER — AMLODIPINE BESYLATE AND ATORVASTATIN CALCIUM 5; 20 MG/1; MG/1
1 TABLET, FILM COATED ORAL DAILY
Qty: 90 TABLET | Refills: 3 | Status: SHIPPED | OUTPATIENT
Start: 2019-07-17

## 2019-07-17 RX ORDER — LEVOFLOXACIN 750 MG/1
750 TABLET ORAL EVERY 24 HOURS
COMMUNITY

## 2019-07-17 NOTE — PROGRESS NOTES
[facility] [address] [address]  Cardiology Follow Up    Yumiko Li  1965  [id number]      1  Hypertension, essential     2  Shortness of breath         Chief Complaint   Patient presents with    Follow-up       Interval History:   Patient presents for follow-up visit  Patient has been fighting a respiratory infection and is being treated by his pulmonologist   Patient had right heart catheterization a few months ago which was negative for any evidence of significant pulmonary hypertension  Patient was denied to have left heart catheterization by his insurance  Instead had a nuclear stress test which was negative for ischemia  Patient still has some cough with expectoration  Patient is on antibiotics  Patient is supposed to see his pulmonologist soon  Patient also has history of hypertension  He states that he has been compliant with all his present medications      Patient Active Problem List   Diagnosis    Hypertension, essential     Past Medical History:   Diagnosis Date    CLL (chronic lymphocytic leukemia) ([facility] )     COPD (chronic obstructive pulmonary disease) ([facility] )     Hypertension      Social History     Socioeconomic History    Marital status: /Civil Union     Spouse name: Not on file    Number of children: Not on file    Years of education: Not on file    Highest education level: Not on file   Occupational History    Not on file   Social Needs    Financial resource strain: Not on file    Food insecurity:     Worry: Not on file     Inability: Not on file    Transportation needs:     Medical: Not on file     Non-medical: Not on file   Tobacco Use    Smoking status: Former Smoker     Last attempt to quit: 1998     Years since quittin 2    Smokeless tobacco: Never Used   Substance and Sexual Activity    Alcohol use: No    Drug use: No    Sexual activity: Not on file   Lifestyle    Physical activity: Days per week: Not on file     Minutes per session: Not on file    Stress: Not on file   Relationships    Social connections:     Talks on phone: Not on file     Gets together: Not on file     Attends Hinduism service: Not on file     Active member of club or organization: Not on file     Attends meetings of clubs or organizations: Not on file     Relationship status: Not on file    Intimate partner violence:     Fear of current or ex partner: Not on file     Emotionally abused: Not on file     Physically abused: Not on file     Forced sexual activity: Not on file   Other Topics Concern    Not on file   Social History Narrative    Not on file      History reviewed  No pertinent family history  Past Surgical History:   Procedure Laterality Date    COLONOSCOPY      LYMPH NODE BIOPSY         Current Outpatient Medications:     amLODIPine-atorvastatin (CADUET) 5-20 MG per tablet, Take 1 tablet by mouth daily, Disp: 90 tablet, Rfl: 3    benazepril (LOTENSIN) 20 mg tablet, Take 20 mg by mouth daily, Disp: , Rfl:     fluticasone-vilanterol (BREO ELLIPTA) 100-25 mcg/inh inhaler, Inhale 1 puff daily Rinse mouth after use , Disp: , Rfl:     Glycopyrrolate-Formoterol (BEVESPI AEROSPHERE) 9-4 8 MCG/ACT AERO, Inhale, Disp: , Rfl:     Ibrutinib (IMBRUVICA) 140 MG CAPS, Take by mouth daily  , Disp: , Rfl:     levofloxacin (LEVAQUIN) 750 mg tablet, Take 750 mg by mouth every 24 hours, Disp: , Rfl:     Mometasone Furo-Formoterol Fum (DULERA) 100-5 MCG/ACT AERO, Inhale, Disp: , Rfl:     Mometasone Furo-Formoterol Fum (DULERA) 200-5 MCG/ACT AERO, Inhale, Disp: , Rfl:     PREDNISONE PO, Take 2 tablets by mouth daily, Disp: , Rfl:     roflumilast (DALIRESP) 500 mcg tablet, Take 500 mcg by mouth daily, Disp: , Rfl:   No Known Allergies    Labs:  No visits with results within 2 Month(s) from this visit     Latest known visit with results is:   Office Visit on 04/24/2019   Component Date Value    WBC 04/27/2019 7 41     RBC 04/27/2019 5 48     Hemoglobin 04/27/2019 14 2     Hematocrit 04/27/2019 44 3     MCV 04/27/2019 81*    MCH 04/27/2019 25 9*    MCHC 04/27/2019 32 1     RDW 04/27/2019 13 9     MPV 04/27/2019 12 7     Platelets 50/29/2107 186     nRBC 04/27/2019 0     Neutrophils Relative 04/27/2019 67     Immat GRANS % 04/27/2019 2     Lymphocytes Relative 04/27/2019 18     Monocytes Relative 04/27/2019 9     Eosinophils Relative 04/27/2019 3     Basophils Relative 04/27/2019 1     Neutrophils Absolute 04/27/2019 5 02     Immature Grans Absolute 04/27/2019 0 11     Lymphocytes Absolute 04/27/2019 1 32     Monocytes Absolute 04/27/2019 0 68     Eosinophils Absolute 04/27/2019 0 19     Basophils Absolute 04/27/2019 0 09     Sodium 04/27/2019 141     Potassium 04/27/2019 4 0     Chloride 04/27/2019 109*    CO2 04/27/2019 25     ANION GAP 04/27/2019 7     BUN 04/27/2019 19     Creatinine 04/27/2019 1 17     Glucose, Fasting 04/27/2019 96     Calcium 04/27/2019 8 6     AST 04/27/2019 20     ALT 04/27/2019 42     Alkaline Phosphatase 04/27/2019 83     Total Protein 04/27/2019 6 8     Albumin 04/27/2019 4 0     Total Bilirubin 04/27/2019 0 48     eGFR 04/27/2019 70     Protime 04/27/2019 12 8     INR 04/27/2019 0 95      Imaging: No results found      Review of Systems:  Review of Systems     REVIEW OF SYSTEMS:  Constitutional:  Denies fever or chills   Eyes:  Denies change in visual acuity   HENT:  Denies nasal congestion or sore throat   Respiratory:   cough and shortness of breath   Cardiovascular:  Denies chest pain or edema   GI:  Denies abdominal pain, nausea, vomiting, bloody stools or diarrhea   :  Denies dysuria, frequency, difficulty in micturition and nocturia  Musculoskeletal:  Denies back pain or joint pain   Neurologic:  Denies headache, focal weakness or sensory changes   Endocrine:  Denies polyuria or polydipsia   Lymphatic:  Denies swollen glands   Psychiatric:  Denies depression or anxiety     Physical Exam:    /84   Pulse 82   Ht 5' 7" (1 702 m)   Wt 83 7 kg (184 lb 9 6 oz)   SpO2 96%   BMI 28 91 kg/m²     Physical Exam   PHYSICAL EXAM:  General:  Patient is not in acute distress   Head: Normocephalic, Atraumatic  HEENT:  Both pupils normal-size atraumatic, normocephalic, nonicteric  Neck:  JVP not raised  Trachea central  No carotid bruit  Respiratory:    Coarse and decreased breath sounds  Cardiovascular:  Regular rate and rhythm no S3 no murmurs  GI:  Abdomen soft nontender  No organomegaly  Lymphatic:  No cervical or inguinal lymphadenopathy  Neurologic:  Patient is awake alert, oriented   Grossly nonfocal   extremities no edema      Discussion/Summary:  Patient overall doing well from cardiovascular standpoint  Patient has shortness of breath appeared likely secondary to pulmonary issues  Patient did have COPD on his pulmonary function tests in the past   Results of a nuclear stress test as well as right heart catheterization reviewed with patient and family  No evidence of significant pulmonary hypertension by right heart catheterization  Patient to follow up with primary care physician as well as pulmonary  Follow-up in 6 months or earlier as needed  Prescriptions refilled